# Patient Record
Sex: FEMALE | Race: WHITE | Employment: UNEMPLOYED | URBAN - METROPOLITAN AREA
[De-identification: names, ages, dates, MRNs, and addresses within clinical notes are randomized per-mention and may not be internally consistent; named-entity substitution may affect disease eponyms.]

---

## 2021-11-22 ENCOUNTER — HOSPITAL ENCOUNTER (EMERGENCY)
Age: 71
Discharge: HOME | End: 2021-11-23
Attending: EMERGENCY MEDICINE
Payer: COMMERCIAL

## 2021-11-22 DIAGNOSIS — T50.901A ACCIDENTAL DRUG INGESTION, INITIAL ENCOUNTER: Primary | ICD-10-CM

## 2021-11-22 PROCEDURE — 99284 EMERGENCY DEPT VISIT MOD MDM: CPT

## 2021-11-22 RX ORDER — CARVEDILOL 12.5 MG/1
12.5 TABLET ORAL 2 TIMES DAILY WITH MEALS
COMMUNITY

## 2021-11-22 RX ORDER — POTASSIUM CHLORIDE 750 MG/1
10 CAPSULE, EXTENDED RELEASE ORAL 2 TIMES DAILY
COMMUNITY

## 2021-11-22 RX ORDER — LOSARTAN POTASSIUM 100 MG/1
100 TABLET ORAL DAILY
COMMUNITY

## 2021-11-22 RX ORDER — PRAVASTATIN SODIUM 40 MG
40 TABLET ORAL DAILY
COMMUNITY

## 2021-11-22 RX ORDER — AMLODIPINE BESYLATE 10 MG/1
10 TABLET ORAL DAILY
COMMUNITY

## 2021-11-22 RX ORDER — BUPROPION HYDROCHLORIDE 300 MG/1
300 TABLET ORAL EVERY MORNING
COMMUNITY

## 2021-11-22 RX ORDER — PHENAZOPYRIDINE HYDROCHLORIDE 200 MG/1
200 TABLET, FILM COATED ORAL 3 TIMES DAILY PRN
COMMUNITY

## 2021-11-22 RX ORDER — HYDROCHLOROTHIAZIDE 12.5 MG/1
12.5 CAPSULE, GELATIN COATED ORAL DAILY
COMMUNITY

## 2021-11-22 RX ORDER — FUROSEMIDE 40 MG/1
40 TABLET ORAL 2 TIMES DAILY
COMMUNITY

## 2021-11-22 RX ORDER — TETRAHYDROZOLINE HCL 0.05 %
1 DROPS OPHTHALMIC (EYE) 3 TIMES DAILY
COMMUNITY

## 2021-11-23 VITALS
OXYGEN SATURATION: 96 % | RESPIRATION RATE: 21 BRPM | SYSTOLIC BLOOD PRESSURE: 166 MMHG | HEART RATE: 73 BPM | DIASTOLIC BLOOD PRESSURE: 67 MMHG | WEIGHT: 190 LBS | TEMPERATURE: 98.9 F

## 2021-11-23 NOTE — ED NOTES
Called and spoke with Nerissa pharmacist from [address] [place] [address],[facility] Floor control, states he did speak with patient prior to her arriving to the ER and did not recommend she needed to be seen in the ER. States very low risk of 1% chance of seizures with the extra dose of Wellbutrin, not concerned about the BP meds or the extra dose of potassium due to length of time in between morning dose taken and extra dose taken tonight. States patient can be discharged home as deemed appropriate by physician.       [facility]  11/23/21 1532

## 2021-11-23 NOTE — ED NOTES
Pt provided with DC and medication education; verbalized understanding. Pt ambulated from Ed with steady gait and all belongings.         Lavern Jordan RN  11/23/21 3146

## 2021-11-23 NOTE — ED TRIAGE NOTES
States she took her morning medications again at 10:30 pm.  welbutrin xl, KCL,  Losartan, hctz. Complains of nause. Denies pain.

## 2021-11-23 NOTE — ED NOTES
Pt took extra morning dose of losarten 100mg, hydrochlorothiazide 25mg, Wellbutrin XL 300mg, Potassium chloride ER 20meq, and coreg 12.5mg tonight instead of her night time meds     Mihaela Lebdetter RN  11/22/21 4242

## 2021-11-23 NOTE — ED PROVIDER NOTES
[facility] ED  EMERGENCY DEPARTMENT ENCOUNTER      Pt Name: Gardenia Celestin  MRN: [id number]  [place] 1950  Date of evaluation: 11/22/2021  Provider: Todd Key MD    [address] [place] [address]       Chief Complaint   Patient presents with    Ingestion     Medication error         HISTORY OF PRESENT ILLNESS   (Location/Symptom, Timing/Onset, Context/Setting, Quality, Duration, Modifying Factors, Severity)  Note limiting factors. 79-year-old female presenting after accidental ingestion of medication. Patient states she took her morning medications in the evening. She did not take her evening medications. She notes no symptoms presently. She called poison control and they recommended that she stay home. Nursing Notes were reviewed. REVIEW OF SYSTEMS    (2-9 systems for level 4, 10 or more for level 5)     Review of Systems   All other systems reviewed and are negative. Except as noted above the remainder of the review of systems was reviewed and negative. PAST MEDICAL HISTORY     Past Medical History:   Diagnosis Date    Anxiety     Cancer [facility])     uterine    Hypertension          SURGICAL HISTORY     History reviewed. No pertinent surgical history.       CURRENT MEDICATIONS       Discharge Medication List as of 11/23/2021 12:09 AM      CONTINUE these medications which have NOT CHANGED    Details   carvedilol (COREG) 12.5 MG tablet Take 12.5 mg by mouth 2 times daily (with meals)Historical Med      furosemide (LASIX) 40 MG tablet Take 40 mg by mouth 2 times dailyHistorical Med      tetrahydrozoline 0.05 % ophthalmic solution 1 drop 3 times dailyHistorical Med      hydroCHLOROthiazide (MICROZIDE) 12.5 MG capsule Take 12.5 mg by mouth dailyHistorical Med      amLODIPine (NORVASC) 10 MG tablet Take 10 mg by mouth dailyHistorical Med      losartan (COZAAR) 100 MG tablet Take 100 mg by mouth dailyHistorical Med      phenazopyridine (PYRIDIUM) 200 MG tablet Take 200 mg by mouth 3 times daily as needed for PainHistorical Med      potassium chloride (MICRO-K) 10 MEQ extended release capsule Take 10 mEq by mouth 2 times dailyHistorical Med      buPROPion (WELLBUTRIN XL) 300 MG extended release tablet Take 300 mg by mouth every morningHistorical Med      pravastatin (PRAVACHOL) 40 MG tablet Take 40 mg by mouth dailyHistorical Med             ALLERGIES     Codeine, Lisinopril, and Penicillins    FAMILY HISTORY     History reviewed. No pertinent family history. SOCIAL HISTORY       Social History     Socioeconomic History    Marital status:      Spouse name: None    Number of children: None    Years of education: None    Highest education level: None   Occupational History    None   Tobacco Use    Smoking status: Never Smoker    Smokeless tobacco: None   Substance and Sexual Activity    Alcohol use: Not Currently    Drug use: Not Currently    Sexual activity: None   Other Topics Concern    None   Social History Narrative    None     Social Determinants of Health     Financial Resource Strain:     Difficulty of Paying Living Expenses: Not on file   Food Insecurity:     Worried About Running Out of Food in the Last Year: Not on file    Osman of Food in the Last Year: Not on file   Transportation Needs:     Lack of Transportation (Medical): Not on file    Lack of Transportation (Non-Medical):  Not on file   Physical Activity:     Days of Exercise per Week: Not on file    Minutes of Exercise per Session: Not on file   Stress:     Feeling of Stress : Not on file   Social Connections:     Frequency of Communication with Friends and Family: Not on file    Frequency of Social Gatherings with Friends and Family: Not on file    Attends Nondenominational Services: Not on file    Active Member of Clubs or Organizations: Not on file    Attends Club or Organization Meetings: Not on file    Marital Status: Not on file   Intimate Partner Violence:     Fear of Current or Ex-Partner: Not on file    Emotionally Abused: Not on file    Physically Abused: Not on file    Sexually Abused: Not on file   Housing Stability:     Unable to Pay for Housing in the Last Year: Not on file    Number of [place] in the Last Year: Not on file    Unstable Housing in the Last Year: Not on file       SCREENINGS    Geo Coma Scale  Eye Opening: Spontaneous  Best Verbal Response: Oriented  Best Motor Response: Obeys commands  Geo Coma Scale Score: 15          PHYSICAL EXAM    (up to 7 for level 4, 8 or more for level 5)     ED Triage Vitals [11/22/21 2332]   BP Temp Temp src Pulse Resp SpO2 Height Weight   (!) 182/82 98.9 °F (37.2 °C) -- 88 18 98 % -- 190 lb (86.2 kg)       Physical Exam  Vitals and nursing note reviewed. Constitutional:       General: She is not in acute distress. Appearance: Normal appearance. She is well-developed. She is not ill-appearing. HENT:      Head: Normocephalic and atraumatic. Mouth/Throat:      Mouth: Mucous membranes are moist.      Pharynx: Oropharynx is clear. Eyes:      Extraocular Movements: Extraocular movements intact. Conjunctiva/sclera: Conjunctivae normal.   Cardiovascular:      Rate and Rhythm: Normal rate and regular rhythm. Pulmonary:      Effort: Pulmonary effort is normal.      Breath sounds: Normal breath sounds. Abdominal:      General: Bowel sounds are normal.      Palpations: Abdomen is soft. Tenderness: There is no abdominal tenderness. Musculoskeletal:         General: No deformity. Normal range of motion. Cervical back: Normal range of motion and neck supple. Skin:     General: Skin is warm and dry. Capillary Refill: Capillary refill takes less than 2 seconds. Neurological:      General: No focal deficit present. Mental Status: She is alert and oriented to person, place, and time. Mental status is at baseline. Cranial Nerves: No cranial nerve deficit. Psychiatric:         Thought Content:  Thought content normal.         DIAGNOSTIC RESULTS     EKG: All EKG's are interpreted by the Emergency Department Physician who either signs or Co-signs this chart in the absence of a cardiologist.    RADIOLOGY:   Non-plain film images such as CT, Ultrasound and MRI are read by the radiologist. Plain radiographic images are visualized and preliminarily interpreted by the emergency physician with the below findings:    Interpretation per the Radiologist below, if available at the time of this note:    No orders to display       LABS:  Labs Reviewed - No data to display    All other labs were within normal range or not returned as of this dictation. EMERGENCY DEPARTMENT COURSE and DIFFERENTIAL DIAGNOSIS/MDM:   Vitals:    Vitals:    11/22/21 2332 11/23/21 0000 11/23/21 0016   BP: (!) 182/82 (!) 166/67 (!) 166/67   Pulse: 88 73    Resp: 18 21    Temp: 98.9 °F (37.2 °C)     SpO2: 98% 96%    Weight: 190 lb (86.2 kg)         MDM  Number of Diagnoses or Management Options  Accidental drug ingestion, initial encounter  Diagnosis management comments: Confirmed with poison control there are no further recommendations; they did not recommend she come to ED. Patient observed and then discharged. Hemodynamically stable throughout ED course. Patient will be discharged home in good condition. Patient has been hemodynamically stable throughout ED course and is appropriate for outpatient follow up. Patient should follow up with PCP in 2-3 days or return to ED immediately for any new or worsening symptoms. Patient is well appearing on discharge and agreeable with plan of care. Procedures    CRITICAL CARE TIME   Total Critical Care time was 0 minutes, excluding separately reportable procedures. There was a high probability of clinically significant/life threatening deterioration in the patient's condition which required my urgent intervention. FINAL IMPRESSION      1.  Accidental drug ingestion, initial encounter Stable DISPOSITION/PLAN   DISPOSITION Decision To Discharge 11/23/2021 12:06:57 AM      (Please note that portions of this note were completed with a voice recognition program.  Efforts were made to edit the dictations but occasionally words are mis-transcribed.)    Caty Barragan MD (electronically signed)  Attending Emergency Physician        Caty Barragan MD  11/23/21 4565